# Patient Record
Sex: MALE | Race: WHITE | NOT HISPANIC OR LATINO | Employment: FULL TIME | URBAN - METROPOLITAN AREA
[De-identification: names, ages, dates, MRNs, and addresses within clinical notes are randomized per-mention and may not be internally consistent; named-entity substitution may affect disease eponyms.]

---

## 2021-03-24 ENCOUNTER — NURSE TRIAGE (OUTPATIENT)
Dept: NURSING | Facility: CLINIC | Age: 36
End: 2021-03-24

## 2021-03-25 ENCOUNTER — VIRTUAL VISIT (OUTPATIENT)
Dept: URGENT CARE | Facility: CLINIC | Age: 36
End: 2021-03-25
Payer: COMMERCIAL

## 2021-03-25 ENCOUNTER — NURSE TRIAGE (OUTPATIENT)
Dept: NURSING | Facility: CLINIC | Age: 36
End: 2021-03-25

## 2021-03-25 DIAGNOSIS — Z20.822 SUSPECTED COVID-19 VIRUS INFECTION: ICD-10-CM

## 2021-03-25 DIAGNOSIS — Z20.822 SUSPECTED COVID-19 VIRUS INFECTION: Primary | ICD-10-CM

## 2021-03-25 LAB
SARS-COV-2 RNA RESP QL NAA+PROBE: NORMAL
SPECIMEN SOURCE: NORMAL

## 2021-03-25 PROCEDURE — U0005 INFEC AGEN DETEC AMPLI PROBE: HCPCS | Performed by: NURSE PRACTITIONER

## 2021-03-25 PROCEDURE — 99203 OFFICE O/P NEW LOW 30 MIN: CPT | Mod: 95 | Performed by: NURSE PRACTITIONER

## 2021-03-25 PROCEDURE — U0003 INFECTIOUS AGENT DETECTION BY NUCLEIC ACID (DNA OR RNA); SEVERE ACUTE RESPIRATORY SYNDROME CORONAVIRUS 2 (SARS-COV-2) (CORONAVIRUS DISEASE [COVID-19]), AMPLIFIED PROBE TECHNIQUE, MAKING USE OF HIGH THROUGHPUT TECHNOLOGIES AS DESCRIBED BY CMS-2020-01-R: HCPCS | Performed by: NURSE PRACTITIONER

## 2021-03-25 NOTE — TELEPHONE ENCOUNTER
See  [phone] (for Kisha Rodriguez).  Both persons are calling together re possible covid.  Current patient already pursued virtual visit for order to seek covid testing.  Kit requests looking into his chart as well as other patient (Kisha Rodriguez) for comparison of  for covid testing.  Done now.  No further action required for this patient.    Devorah MORAN [facility] Nurse Advisor     Additional Information    General information question, no triage required and triager able to answer question    Protocols used: INFORMATION ONLY CALL-A-AH

## 2021-03-25 NOTE — PROGRESS NOTES
"  Kit Goncalves is a 35 year old male who is being evaluated via a billable telephone visit.      The patient has been notified of following:     \"This telephone visit will be conducted via a call between you and your physician/provider. We have found that certain health care needs can be provided without the need for a physical exam.  This service lets us provide the care you need with a short phone conversation.  If a prescription is necessary we can send it directly to your pharmacy.  If lab work is needed we can place an order for that and you can then stop by our lab to have the test done at a later time.    If during the course of the call the physician/provider feels a telephone visit is not appropriate, you will not be charged for this service.\"     Patient has given verbal consent for Telephone visit?  Yes       ASSESSMENT:       ICD-10-CM    1. Suspected COVID-19 virus infection  Z20.822 Symptomatic COVID-19 Virus (Coronavirus) by PCR     Encouraged increased fluid OTC medications for comfort.    Worrisome symptoms discussed with instructions to go to the ED.  Patient verbalized understanding and agreed with this plan.  Chief Complaint:    Chief Complaint   Patient presents with     Covid 19 Testing       HPI: Kit Goncalves is an 35 year old male who calls with concerns that include cough, headache, congestion, fever that is intermittent, nausea, some shortness of breath, diminished smell and taste. 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 Wife is developing similar symptoms.    Took 1st Moderna vaccine one day before symptoms started.    Ibuprofen, cough drops, Robitussin.      ROS:      A 10 point ROS is negative except as expressed in HPI.    Past Medical History  No past medical history on file.      Allergies:  Not on File     Current Meds:  No current outpatient medications on file.     PHYSICAL EXAM:     Patient is alert and oriented. Able to speak in full sentences. No wheezing or " cough heard during telephone conversation. Mood is appropriate.     Joanie Osman CNP  3/25/2021, 9:40 AM      Phone call duration:  21 minutes

## 2021-03-25 NOTE — TELEPHONE ENCOUNTER
Moderna vaccine on 3/18   Weak, vomiting, temps ranging from 97.2 - 101. Is sweating. Denies difficulty breathing - is taking ibuprofen. States he feels weak. Currently oral temp 98.8, 97.2, 97.7. Thermometer doesn't seem to be consistent. Patient is drinking fluids, peeing normally.    Per protocol advised virtual  tomorrow - encouraged increasing fluids. Warm transferred to scheduling.    Gilda Miller RN on 3/24/2021 at 9:07 PM    Reason for Disposition    COVID-19 vaccine, systemic reactions (e.g., fatigue, fever, muscle aches), questions about    Additional Information    Negative: [1] Difficulty breathing or swallowing AND [2] starts within 2 hours after injection    Negative: Sounds like a life-threatening emergency to the triager    Negative: [1] COVID-19 exposure AND [2] no symptoms    Negative: [1] Typical COVID-19 symptoms AND [2] symptoms that are NOT expected from vaccine (e.g., cough, difficulty breathing, loss of taste or smell, runny nose, sore throat)    Negative: [1] Typical COVID-19 symptoms AND [2] started > 3 days after getting vaccine    Negative: Fever > 104 F (40 C)    Negative: Sounds like a severe, unusual reaction to the triager    Negative: [1] Redness or red streak around the injection site AND [2] started > 48 hours after getting vaccine AND [3] fever    Negative: [1] Fever > 101 F (38.3 C) AND [2] age > 60 AND [3] started > 48 hours after getting vaccine    Negative: [1] Fever > 100.0 F (37.8 C) AND [2] bedridden (e.g., nursing home patient, CVA, chronic illness, recovering from surgery) AND [3] started > 48 hours after getting vaccine    Negative: [1] Fever > 100.0 F (37.8 C) AND [2] diabetes mellitus or weak immune system (e.g., HIV positive, cancer chemo, splenectomy, organ transplant, chronic steroids) AND [3] started > 48 hours after getting vaccine    Negative: [1] Redness or red streak around the injection site AND [2] started > 48 hours after getting vaccine AND [3] no fever   (Exception: red area < 1 inch or 2.5 cm wide)    Negative: [1] Pain, tenderness, or swelling at the injection site AND [2] over 3 days (72 hours) since vaccine AND [3] getting worse    Negative: Fever > 100.0 F (37.8 C) present > 3 days (72 hours)    Negative: [1] Fever > 100.0 F (37.8 C) AND [2] healthcare worker    Negative: [1] Pain, tenderness, or swelling at the injection site AND [2] lasts > 7 days    Negative: [1] Requesting COVID-19 vaccine AND [2] healthcare worker (e.g., EMS first responders, doctors, nurses)    Negative: [1] Requesting COVID-19 vaccine AND [2] resident of a long-term care facility (e.g., nursing home)    Negative: [1] Requesting COVID-19 vaccine AND [2] vaccine available in the community for this patient group    Negative: COVID-19 vaccine, injection site reaction (e.g., pain, redness, swelling), question about    Protocols used: CORONAVIRUS (COVID-19) VACCINE QUESTIONS AND [id number] 1.3

## 2021-03-25 NOTE — PATIENT INSTRUCTIONS
"  Patient Education   After Your COVID-19 (Coronavirus) Test  You have been tested for COVID-19 (coronavirus).   If you'll have surgery in the next few days, we'll let you know ahead of time if you have the virus. Please call your surgeon's office with any questions.  For all other patients: Results are usually available in [employer] within 2 to 3 days.   If you do not have a [employer] account, you'll get a letter in the mail in about 7 to 10 days.   [employer] is often the fastest way to get test results. Please sign up if you do not already have a [employer] account. See the handout Getting COVID-19 Test Results in [employer] for help.  What if my test result is positive?  If your test is positive and you have not viewed your result in [employer], you'll get a phone call with your result. (A positive test means that you have the virus.)     Follow the tips under \"How do I self-isolate?\" below for 10 days (20 days if you have a weak immune system).    You don't need to be retested for COVID-19 before going back to school or work. As long as you're fever-free and feeling better, you can go back to school, work and other activities after waiting the 10 or 20 days.  What if I have questions after I get my results?  If you have questions about your results, please visit our testing website at [email or web address].   After 7 to 10 days, if you have not gotten your results:     Call [phone] ([phone]) and ask to speak with our COVID-19 results team.    If you're being treated at an infusion center: Call your infusion center directly.  What are the symptoms of COVID-19?  Cough, fever and trouble breathing are the most common signs of COVID-19.  Other symptoms can include new headaches, new muscle or body aches, new and unexplained fatigue (feeling very tired), chills, sore throat, congestion (stuffy or runny nose), diarrhea (loose poop), loss of taste or smell, belly pain, and nausea or vomiting " "(feeling sick to your stomach or throwing up).  You may already have symptoms of COVID-19, or they may show up later.  What should I do if I have symptoms?  If you're having surgery: Call your surgeon's office.  For all other patients: Stay home and away from others (self-isolate) until ...    You've had no fever--and no medicine that reduces fever--for 1 full day (24 hours), AND    Other symptoms have gotten better. For example, your cough or breathing has improved, AND    At least 10 days have passed since your symptoms first started.  How do I self-isolate?    Stay in your own room, even for meals. Use your own bathroom if you can.    Stay away from others in your home. No hugging, kissing or shaking hands. No visitors.    Don't go to work, school or anywhere else.    Clean \"high touch\" surfaces often (doorknobs, counters, handles). Use household cleaning spray or wipes. You'll find a full list of  on the EPA website: www.epa.gov/pesticide-registration/list-n-disinfectants-use-against-sars-cov-2.    Cover your mouth and nose with a mask or other face covering to avoid spreading germs.    Wash your hands and face often. Use soap and water.    Caregivers in these groups are at risk for severe illness due to COVID-19:  ? People 65 years and older  ? People who live in a nursing home or long-term care facility  ? People with chronic disease (lung, heart, cancer, diabetes, kidney, liver, immunologic)  ? People who have a weakened immune system, including those who:    Are in cancer treatment    Take medicine that weakens the immune system, such as corticosteroids    Had a bone marrow or organ transplant    Have an immune deficiency    Have poorly controlled HIV or AIDS    Are obese (body mass index of 40 or higher)    Smoke regularly    Caregivers should wear gloves while washing dishes, handling laundry and cleaning bedrooms and bathrooms.    Use caution when washing and drying laundry: Don't shake dirty " laundry and use the warmest water setting that you can.    For more tips on managing your health at home, go to www.cdc.gov/coronavirus/2019-ncov/downloads/10Things.pdf.  How can I take care of myself at home?  1. Get lots of rest. Drink extra fluids (unless a doctor has told you not to).  2. Take Tylenol (acetaminophen) for fever or pain. If you have liver or kidney problems, ask your family doctor if it's OK to take Tylenol.   Adults can take either:  ? 650 mg (two 325 mg pills) every 4 to 6 hours, or   ? 1,000 mg (two 500 mg pills) every 8 hours as needed.  ? Note: Don't take more than 3,000 mg in one day. Acetaminophen is found in many medicines (both prescribed and over-the-counter medicines). Read all labels to be sure you don't take too much.   For children, check the Tylenol bottle for the right dose. The dose is based on the child's age or weight.  3. If you have other health problems (like cancer, heart failure, an organ transplant or severe kidney disease): Call your specialty clinic if you don't feel better in the next 2 days.  4. Know when to call 911. Emergency warning signs include:  ? Trouble breathing or shortness of breath  ? Chest pain or pressure that doesn't go away  ? Feeling confused like you haven't felt before, or not being able to wake up  ? Bluish-colored lips or face  5. If your doctor prescribed a blood thinner medicine: Follow their instructions.  Where can I get more information?    [facility] - About COVID-19:   [email or web address]    CDC - If You're Sick: cdc.gov/coronavirus/2019-ncov/about/steps-when-sick.html    CDC - Ending Home Isolation: www.cdc.gov/coronavirus/2019-ncov/hcp/disposition-in-home-patients.html    CDC - Caring for Someone: www.cdc.gov/coronavirus/2019-ncov/if-you-are-sick/care-for-someone.html    [facility] - Interim Guidance for Hospital Discharge to Home: www.health.[facility].[place]/diseases/coronavirus/hcp/hospdischarge.pdf    [facility]  clinical trials (COVID-19 research studies): [email or web address].[email or web address]-clinical-trials    Below are the COVID-19 hotlines at the [place] Department of Health ([facility]). Interpreters are available.  ? For health questions: Call 985-647-1213 or [phone] (7 a.m. to 7 p.m.)  ? For questions about schools and childcare: Call 738-684-1437 or [phone] (7 a.m. to 7 p.m.)    For informational purposes only. Not to replace the advice of your health care provider. Clinically reviewed by Infection Prevention and the [facility] COVID-19 Clinical Team. Copyright   2020 [facility]. All rights reserved. [employer] 995763 - Rev 11/11/20.

## 2021-03-26 ENCOUNTER — NURSE TRIAGE (OUTPATIENT)
Dept: NURSING | Facility: CLINIC | Age: 36
End: 2021-03-26

## 2021-03-26 ENCOUNTER — TELEPHONE (OUTPATIENT)
Dept: URGENT CARE | Facility: CLINIC | Age: 36
End: 2021-03-26

## 2021-03-26 LAB
LABORATORY COMMENT REPORT: ABNORMAL
SARS-COV-2 RNA RESP QL NAA+PROBE: POSITIVE
SPECIMEN SOURCE: ABNORMAL

## 2021-03-26 NOTE — TELEPHONE ENCOUNTER
Coronavirus (COVID-19) Notification    Reason for call  Notify of POSITIVE  COVID-19 lab result, assess symptoms,  review [facility] recommendations    Lab Result   Lab test for 2019-nCoV rRt-PCR or SARS-COV-2 PCR  Oropharyngeal AND/OR nasopharyngeal swabs were POSITIVE for 2019-nCoV RNA [OR] SARS-COV-2 RNA (COVID-19) RNA     We have been unable to reach Patient by phone at this time to notify of their Positive COVID-19 result.  Left voicemail message requesting a call back to [phone] [facility] for results.        POSITIVE COVID-19 Letter sent.    Blank Donahue LPN

## 2021-03-26 NOTE — TELEPHONE ENCOUNTER
"-Coronavirus (COVID-19) Notification    Caller Name (Patient, parent, daughter/son, grandparent, etc)  Kit, patient    Reason for call  Notify of Positive Coronavirus (COVID-19) lab results, assess symptoms,  review  [facility] recommendations    Lab Result    Lab test:  2019-nCoV rRt-PCR or SARS-CoV-2 PCR    Oropharyngeal AND/OR nasopharyngeal swabs is POSITIVE for 2019-nCoV RNA/SARS-COV-2 PCR (COVID-19 virus)    RN Recommendations/Instructions per [facility] Coronavirus COVID-19 recommendations    Brief introduction script  Introduce self then review script:  \"I am calling on behalf of [employer].  We were notified that your Coronavirus test (COVID-19) for was POSITIVE for the virus.  I have some information to relay to you but first I wanted to mention that the [place] Dept of Health will be contacting you shortly [it's possible MD already called Patient] to talk to you more about how you are feeling and other people you have had contact with who might now also have the virus.  Also,  [employer] is Partnering with the [facility] for Covid-19 research, you may be contacted directly by research staff.\"    Assessment (Inquire about Patient's current symptoms)   Assessment   Current Symptoms at time of phone call: (if no symptoms, document No symptoms] Severe cough, patient cannot catch his breath during coughing spell, lips are not blue at this time, dizziness, nausea, diarrhea, severe fatigue  Severe brain fog, fever, chills, body aches, light chest pain and pressure  Nasal congestion.  Transferred patient to COVID Triage Nurse.   Symptoms onset (if applicable) 3/19/2021     If at time of call, Patients symptoms hare worsened, the Patient should contact 911 or have someone drive them to Emergency Dept promptly:      If Patient calling 911, inform 911 personal that you have tested positive for the Coronavirus (COVID-19).  Place mask on and await 911 to arrive.    If Emergency Dept, " "If possible, please have another adult drive you to the Emergency Dept but you need to wear mask when in contact with other people.      Monoclonal Antibody Administration    You may be eligible to receive a new treatment with a monoclonal antibody for preventing hospitalization in patients at high risk for complications from COVID-19.   This medication is still experimental and available on a limited basis; it is given through an IV and must be given at an infusion center. Please note that not all people who are eligible will receive the medication since it is in limited supply.     Are you interested in being considered for this medication?  No.   Does the patient fit the criteria: Patient declined    If patient qualifies based on above criteria:  \"We will contact you if you are selected to receive the medication in the next 1-2 days.   This is time sensitive and if you are not selected in the next 1-2 days, you will not receive the medication.  If you do not receive a call to schedule, you have not been selected.\"      Review information with Patient    Your result was positive. This means you have COVID-19 (coronavirus).  We have sent you a letter that reviews the information that I'll be reviewing with you now.    How can I protect others?    If you have symptoms: stay home and away from others (self-isolate) until:    You've had no fever--and no medicine that reduces fever--for 1 full day (24 hours). And       Your other symptoms have gotten better. For example, your cough or breathing has improved. And     At least 10 days have passed since your symptoms started. (If you've been told by a doctor that you have a weak immune system, wait 20 days.)     If you don't have symptoms: Stay home and away from others (self-isolate) until at least 10 days have passed since your first positive COVID-19 test. (Date test collected)    During this time:    Stay in your own room, including for meals. Use your own bathroom if " you can.    Stay away from others in your home. No hugging, kissing or shaking hands. No visitors.     Don't go to work, school or anywhere else.     Clean  high touch  surfaces often (doorknobs, counters, handles, etc.). Use a household cleaning spray or wipes. You'll find a full list on the EPA website at www.epa.gov/pesticide-registration/list-n-disinfectants-use-against-sars-cov-2.     Cover your mouth and nose with a mask, tissue or other face covering to avoid spreading germs.    Wash your hands and face often with soap and water.    Make a list of people you have been in close contact with recently, even if either of you wore a face covering.   ; Start your list from 2 days before you became ill or had a positive test.  ; Include anyone that was within 6 feet of you for a cumulative total of 15 minutes or more in 24 hours. (Example: if you sat next to Canelo for 5 minutes in the morning and 10 minutes in the afternoon, then you were in close contact for 15 minutes total that day. Canelo would be added to your list.)    A public health worker will call or text you. It is important that you answer. They will ask you questions about possible exposures to COVID-19, such as people you have been in direct contact with and places you have visited.    Tell the people on your list that you have COVID-19; they should stay away from others for 14 days starting from the last time they were in contact with you (unless you are told something different from a public health worker).     Caregivers in these groups are at risk for severe illness due to COVID-19:  o People 65 years and older  o People who live in a nursing home or long-term care facility  o People with chronic disease (lung, heart, cancer, diabetes, kidney, liver, immunologic)  o People who have a weakened immune system, including those who:  - Are in cancer treatment  - Take medicine that weakens the immune system, such as corticosteroids  - Had a bone marrow or  organ transplant  - Have an immune deficiency  - Have poorly controlled HIV or AIDS  - Are obese (body mass index of 40 or higher)  - Smoke regularly    Caregivers should wear gloves while washing dishes, handling laundry and cleaning bedrooms and bathrooms.    Wash and dry laundry with special caution. Don't shake dirty laundry, and use the warmest water setting you can.    If you have a weakened immune system, ask your doctor about other actions you should take.    For more tips, go to www.cdc.gov/coronavirus/2019-ncov/downloads/10Things.pdf.    You should not go back to work until you meet the guidelines above for ending your home isolation. You don't need to be retested for COVID-19 before going back to work--studies show that you won't spread the virus if it's been at least 10 days since your symptoms started (or 20 days, if you have a weak immune system).    Employers: This document serves as formal notice of your employee's medical guidelines for going back to work. They must meet the above guidelines before going back to work in person.    How can I take care of myself?    1. Get lots of rest. Drink extra fluids (unless a doctor has told you not to).    2. Take Tylenol (acetaminophen) for fever or pain. If you have liver or kidney problems, ask your family doctor if it's okay to take Tylenol.     Take either:     650 mg (two 325 mg pills) every 4 to 6 hours, or     1,000 mg (two 500 mg pills) every 8 hours as needed.     Note: Don't take more than 3,000 mg in one day. Acetaminophen is found in many medicines (both prescribed and over-the-counter medicines). Read all labels to be sure you don't take too much.    For children, check the Tylenol bottle for the right dose (based on their age or weight).    3. If you have other health problems (like cancer, heart failure, an organ transplant or severe kidney disease): Call your specialty clinic if you don't feel better in the next 2 days.    4. Know when to call  911: Emergency warning signs include:    Trouble breathing or shortness of breath    Pain or pressure in the chest that doesn't go away    Feeling confused like you haven't felt before, or not being able to wake up    Bluish-colored lips or face    5. Sign up for [employer]. We know it's scary to hear that you have COVID-19. We want to track your symptoms to make sure you're okay over the next 2 weeks. Please look for an email from [employer]--this is a free, online program that we'll use to keep in touch. To sign up, follow the link in the email. Learn more at [email or web address].    Where can I get more information?    [facility]: [email or web address]    Coronavirus Basics: www.health.[facility].[place]/diseases/coronavirus/basics.html    What to Do If You're Sick: www.cdc.gov/coronavirus/2019-ncov/about/steps-when-sick.html    Ending Home Isolation: www.cdc.gov/coronavirus/2019-ncov/hcp/disposition-in-home-patients.html     Caring for Someone with COVID-19: www.cdc.gov/coronavirus/2019-ncov/if-you-are-sick/care-for-someone.html     [facility] clinical trials (COVID-19 research studies): clinicalaffairs.[email or web address]/n-clinical-trials     A Positive COVID-19 letter will be sent via [employer] or the mail. (Exception, no letters sent to Presurgerical/Preprocedure Patients)    Christiane Brower LPN

## 2021-03-26 NOTE — TELEPHONE ENCOUNTER
"\"I was dx with covid yesterday and I am coughing(dry), diarrhea, headache, tiredness, chills.\"    Denies fever, SOB or other sx   Triaged, gave home care advice and when to seek emergency care if needed.  Call back if needed.  Lety Oates RN [facility]    COVID 19 Nurse Triage Plan/Patient Instructions    Please be aware that novel coronavirus (COVID-19) may be circulating in the community. If you develop symptoms such as fever, cough, or SOB or if you have concerns about the presence of another infection including coronavirus (COVID-19), please contact your health care provider or visit [email or web address].     Disposition/Instructions    Home care recommended. Follow home care protocol based instructions.    Thank you for taking steps to prevent the spread of this virus.  o Limit your contact with others.  o Wear a simple mask to cover your cough.  o Wash your hands well and often.    Resources    [facility]: About COVID-19: [email or web address]    CDC: What to Do If You're Sick: www.cdc.gov/coronavirus/2019-ncov/about/steps-when-sick.html    CDC: Ending Home Isolation: www.cdc.gov/coronavirus/2019-ncov/hcp/disposition-in-home-patients.html     CDC: Caring for Someone: www.cdc.gov/coronavirus/2019-ncov/if-you-are-sick/care-for-someone.html     [facility]: Interim Guidance for Hospital Discharge to Home: www.health.[facility].[place]/diseases/coronavirus/hcp/hospdischarge.pdf    [facility] clinical trials (COVID-19 research studies): [email or web address].[facility]/n-clinical-trials     Below are the COVID-19 hotlines at the [facility] of Health ([facility]). Interpreters are available.   o For health questions: Call 811-890-4424 or 1-772.110.6690 (7 a.m. to 7 p.m.)  o For questions about schools and childcare: Call 206-611-6796 or 1-187.125.7030 (7 a.m. to 7 p.m.)                     Reason for Disposition    [1] COVID-19 diagnosed by positive lab test AND [2] mild " symptoms (e.g., cough, fever, others) AND [3] no complications or SOB    Additional Information    Negative: SEVERE difficulty breathing (e.g., struggling for each breath, speaks in single words)    Negative: Difficult to awaken or acting confused (e.g., disoriented, slurred speech)    Negative: Bluish (or gray) lips or face now    Negative: Shock suspected (e.g., cold/pale/clammy skin, too weak to stand, low BP, rapid pulse)    Negative: Sounds like a life-threatening emergency to the triager    Negative: SEVERE or constant chest pain or pressure (Exception: mild central chest pain, present only when coughing)    Negative: MODERATE difficulty breathing (e.g., speaks in phrases, SOB even at rest, pulse 100-120)    Negative: [1] Headache AND [2] stiff neck (can't touch chin to chest)    Negative: MILD difficulty breathing (e.g., minimal/no SOB at rest, SOB with walking, pulse <100)    Negative: Chest pain or pressure    Negative: Patient sounds very sick or weak to the triager    Negative: Fever > 103 F (39.4 C)    Negative: [1] Fever > 101 F (38.3 C) AND [2] age > 60    Negative: [1] Fever > 100.0 F (37.8 C) AND [2] bedridden (e.g., nursing home patient, CVA, chronic illness, recovering from surgery)    Negative: [1] HIGH RISK patient (e.g., age > 64 years, diabetes, heart or lung disease, weak immune system) AND [2] new or worsening symptoms    Negative: [1] HIGH RISK patient AND [2] influenza is widespread in the community AND [3] ONE OR MORE respiratory symptoms: cough, sore throat, runny or stuffy nose    Negative: [1] HIGH RISK patient AND [2] influenza exposure within the last 7 days AND [3] ONE OR MORE respiratory symptoms: cough, sore throat, runny or stuffy nose    Negative: Fever present > 3 days (72 hours)    Negative: [1] Fever returns after gone for over 24 hours AND [2] symptoms worse or not improved    Negative: [1] Continuous (nonstop) coughing interferes with work or school AND [2] no improvement  using cough treatment per protocol    Negative: [1] COVID-19 infection suspected by caller or triager AND [2] mild symptoms (cough, fever, or others) AND [3] no complications or SOB    Negative: Cough present > 3 weeks    Protocols used: CORONAVIRUS (COVID-19) DIAGNOSED OR [id number] 1.3

## 2021-05-09 ENCOUNTER — HEALTH MAINTENANCE LETTER (OUTPATIENT)
Age: 36
End: 2021-05-09

## 2021-10-24 ENCOUNTER — HEALTH MAINTENANCE LETTER (OUTPATIENT)
Age: 36
End: 2021-10-24

## 2022-01-02 ENCOUNTER — NURSE TRIAGE (OUTPATIENT)
Dept: NURSING | Facility: CLINIC | Age: 37
End: 2022-01-02

## 2022-01-03 NOTE — TELEPHONE ENCOUNTER
Has been sick since the day before Mati Rodas    Coughing fits, cant breathe   Difficulty breathing  Hacking up nasty mucus  White nasal discharge   Heavy sinus congestion   Pink eye   White pus   Blurry vision     Getting worse all the time    NO PCP    COVID 19 Nurse Triage Plan/Patient Instructions    Please be aware that novel coronavirus (COVID-19) may be circulating in the community. If you develop symptoms such as fever, cough, or SOB or if you have concerns about the presence of another infection including coronavirus (COVID-19), please contact your health care provider or visit [email or web address].     Disposition/Instructions    ED Visit recommended. Follow protocol based instructions.     Bring Your Own Device:  Please also bring your smart device(s) (smart phones, tablets, laptops) and their charging cables for your personal use and to communicate with your care team during your visit.    Thank you for taking steps to prevent the spread of this virus.  o Limit your contact with others.  o Wear a simple mask to cover your cough.  o Wash your hands well and often.    Resources    [facility]: About COVID-19: [email or web address]    CDC: What to Do If You're Sick: www.cdc.gov/coronavirus/2019-ncov/about/steps-when-sick.html    CDC: Ending Home Isolation: www.cdc.gov/coronavirus/2019-ncov/hcp/disposition-in-home-patients.html     CDC: Caring for Someone: www.cdc.gov/coronavirus/2019-ncov/if-you-are-sick/care-for-someone.html     [facility]: Interim Guidance for Hospital Discharge to Home: www.health.[facility].mn.us/diseases/coronavirus/hcp/hospdischarge.pdf    [facility] clinical [facility] (COVID-19 research studies): clinicalaffairs.[facility].edu/umn-clinical-trials     Below are the COVID-19 hotlines at the [place] [facility] ([facility]). Interpreters are available.   o For health questions: Call 288-375-2461 or 1-724.150.6471 (7 a.m. to 7 p.m.)  o For questions about schools  and childcare: Call 492-821-9811 or 1-850.645.4432 (7 a.m. to 7 p.m.)     Reason for Disposition    MODERATE difficulty breathing (e.g., speaks in phrases, SOB even at rest, pulse 100-120)    Additional Information    Negative: SEVERE difficulty breathing (e.g., struggling for each breath, speaks in single words)    Negative: Difficult to awaken or acting confused (e.g., disoriented, slurred speech)    Negative: Bluish (or gray) lips or face now    Negative: Shock suspected (e.g., cold/pale/clammy skin, too weak to stand, low BP, rapid pulse)    Negative: Sounds like a life-threatening emergency to the triager    Negative: [1] COVID-19 exposure AND [2] no symptoms    Negative: COVID-19 vaccine reaction suspected (e.g., fever, headache, muscle aches) occurring 1 to 3 days after getting vaccine    Negative: COVID-19 vaccine, questions about    Negative: [1] Lives with someone known to have influenza (flu test positive) AND [2] flu-like symptoms (e.g., cough, runny nose, sore throat, SOB; with or without fever)    Negative: [1] Adult with possible COVID-19 symptoms AND [2] triager concerned about severity of symptoms or other causes    Negative: COVID-19 and breastfeeding, questions about    Negative: SEVERE or constant chest pain or pressure (Exception: mild central chest pain, present only when coughing)    Protocols used: CORONAVIRUS (COVID-19) DIAGNOSED OR [id number] 8.25.2021    Shawna Ferguson RN Triage Nurse Advisor

## 2022-06-05 ENCOUNTER — HEALTH MAINTENANCE LETTER (OUTPATIENT)
Age: 37
End: 2022-06-05

## 2022-09-12 ENCOUNTER — HOSPITAL ENCOUNTER (EMERGENCY)
Facility: CLINIC | Age: 37
Discharge: HOME | End: 2022-09-13
Attending: EMERGENCY MEDICINE | Admitting: EMERGENCY MEDICINE

## 2022-09-12 VITALS
RESPIRATION RATE: 16 BRPM | SYSTOLIC BLOOD PRESSURE: 175 MMHG | OXYGEN SATURATION: 99 % | HEART RATE: 85 BPM | DIASTOLIC BLOOD PRESSURE: 94 MMHG | TEMPERATURE: 98.2 F

## 2022-09-12 DIAGNOSIS — F41.9 ANXIETY: ICD-10-CM

## 2022-09-12 DIAGNOSIS — R45.851 PASSIVE SUICIDAL IDEATIONS: ICD-10-CM

## 2022-09-12 PROCEDURE — 99285 EMERGENCY DEPT VISIT HI MDM: CPT | Mod: 25

## 2022-09-12 PROCEDURE — 90791 PSYCH DIAGNOSTIC EVALUATION: CPT

## 2022-09-12 ASSESSMENT — ENCOUNTER SYMPTOMS
DIARRHEA: 0
COUGH: 1
DYSPHORIC MOOD: 1
NERVOUS/ANXIOUS: 1
CHILLS: 0
FEVER: 0
DECREASED CONCENTRATION: 1
VOMITING: 0

## 2022-09-12 ASSESSMENT — ACTIVITIES OF DAILY LIVING (ADL)
ADLS_ACUITY_SCORE: 35
ADLS_ACUITY_SCORE: 35

## 2022-09-13 NOTE — ED TRIAGE NOTES
"    Patient reports symptoms for years, but hasnt had insurance to seek treatment. Symptoms have gotten progressively worse which is what prompted him to seek treatment today. He reports being paranoid, he believes that everyone else is talking about him and is out to get him. He has also been spending lots of money and has feelings of extreme euphoria that he knows are not appropriate to the situation. Recently he has developed suicidal thoughts. He wife states that he has planned on inhaling car exhaust or drowning self in bathtub, but has never acted on these thoughts. In triage he contracts for safety and \"just wants to get help.\"  "

## 2022-09-13 NOTE — DISCHARGE INSTRUCTIONS
Aftercare Plan  If I am feeling unsafe or I am in a crisis, I will:   Contact my established care providers   Call the National Suicide Prevention Lifeline: 988  Go to the nearest emergency room   Call 911     Warning signs that I or other people might notice when a crisis is developing for me:   Increased anxiety  Anger outbursts   Inability to regulate emotions  Feelings of hopelessness    Things I am able to do on my own to cope or help me feel better:   Deep breathing  Play computer games  Rest  Long hot shower    Things that I am able to do with others to cope or help me better:   Spend time with Dayana, her family, and two cats at home    Things I can use or do for distraction:   Planning for the future  [employer] courses  [employer] selling    Changes I can make to support my mental health and wellness:   Begin individual therapy  Continue to spend time with Dayana and her mom  Return to work from home  Obtain health insurance       People in my life that I can ask for help: Dayana and her mom    Your [facility] has a mental health crisis team you can call 24/7: [facility] Crisis  [phone]    Other things that are important when I'm in crisis: Remember that there is light at the end of the tunnel, you have big career aspirations and a future family life with Dayana.      Additional resources and information:     It is recommended that you obtain insurance ASAP. [employer] application can be completed at: [email or web address]      Crisis Lines  Crisis Text Line  Text 873820  You will be connected with a trained live crisis counselor to provide support.    Por andres, texto  CODY a 058150 o texto a 442-AYUDAME en WhatsApp    The [employer] (LGBTQ Youth Crisis Line)  0.936.484.6723  text START to 445-708      Community Resources  Fast Tracker  Linking people to mental health and substance use disorder resources  [employer].org     [place] Mental Health Warm Line  Peer to peer support  Monday thru  "Saturday, 12 pm to 10 pm  725.733.2431 or 4.313.102.8433  Text \"Support\" to 50037    National [place] on Mental Illness ([place])  539.580.3579 or 1.888.[place]      Mental Health Apps  My3  https://RentBitspp.org/    VirtualHopeBox  https://[employer] Link/apps/virtual-hope-box/      [facility] SCHEDULING:  Today you were seen by a licensed mental health professional through [facility] sevices, [facility] ([facility])  for a crisis assessment in the Emergency Department at [facility].  It is recommended that you follow up with your estabished providers (psychiatrist, mental health therapist, and/or primary care doctor - as relevant) as soon as possible. Coordinators from [facility] will be calling you in the next 24-48 hours to ensure that you have the resources you need.  You can also contact [facility] coordinators directly at [phone].    You have been scheduled the following appointments:     Date: Thursday, 9/15/2022  Time: 12:00 pm - 1:00 pm  Provider: Yessenia Bazzi MA  Location: [address] [place], [place] [address]  Phone: [phone]  Type: Teletherapy    Patient Instructions  To reduce no shows, we ask that patients call our office at [phone] and confirm their appointment and leave their email. We make effort to reach each client, but if we cannot reach them or they do not confirm appointment, the appointment will be removed. Please ask patients to leave a voicemail with their information.    [facility] maintains an extensive network of licensed behavioral health providers to connect patients with the services they need.  We do not charge providers a fee to participate in our referral network.  We match patients with providers based on a patient s specific needs, insurance coverage, and location.  Our first effort will be to refer you to a provider within your care system, and will utilize providers outside your care system as needed.      Discharge " Instructions  Mental Health Concerns    You were seen today for mental health concerns, such as depression, anxiety, or suicidal thinking. Your provider feels that you do not require hospitalization at this time. However, your symptoms may become worse, and you may need to return to the Emergency Department. Most treatments of depression and suicidal thoughts are a process rather than a single intervention.  Medications and counseling can take several weeks or more to help.    Generally, every Emergency Department visit should have a follow-up clinic visit with either a primary or a specialty clinic/provider. Please follow-up as instructed by your emergency provider today.    By accepting these discharge instructions:  You promise to not harm yourself or others.  You agree that if you feel you are becoming unable to keep that promise, you will do something to help yourself before you do anything to harm yourself or others.   You agree to keep any safety plan arranged on your visit here today.  You agree to take any medication prescribed or recommended by your provider.  If you are getting worse, you can contact a friend or a family member, contact your counselor or family provider, contact a crisis line, or other options discussed with the provider or therapist today.  At any time, you can call 911 and return to the Emergency Department for more help.  You understand that follow-up is essential to your treatment, and you will make and keep appointments recommended on your visit today.    How to improve your mental health and prevent suicide:  Involve others by letting family, friends, counselors know.  Do not isolate yourself.  Avoid alcohol or drugs. Remove weapons, poisons from your home.  Try to stick to routines for eating, sleeping and getting regular exercise.    Try to get into sunlight. Bright natural light not only treats seasonal affective disorder but also depression.  Increase safe activities that you  enjoy.    If you feel worse, contact the National Suicide Prevention Lifeline at 1-131.614.7908, or call 911, or your primary provider/counselor for additional assistance.      If you were given a prescription for medicine here today, be sure to read all of the information (including the package insert) that comes with your prescription.  This will include important information about the medicine, its side effects, and any warnings that you need to know about.  The pharmacist who fills the prescription can provide more information and answer questions you may have about the medicine.  If you have questions or concerns that the pharmacist cannot address, please call or return to the Emergency Department.     Remember that you can always come back to the Emergency Department if you are not able to see your regular provider in the amount of time listed above, if you get any new symptoms, or if there is anything that worries you.

## 2022-09-13 NOTE — CONSULTS
"Diagnostic Evaluation Consultation  Crisis Assessment    Patient was assessed: In Person  Patient location: [facility] ED  Was a release of information signed: Yes. Providers included on the release: Pt has no established providers, signed for referrals for OP care      Referral Data and Chief Complaint  Jeffrey Goncalves is a 36 year old, who uses he/him pronouns, and presents to the ED with family/friends. Patient is referred to the ED by self. Patient is presenting to the ED for the following concerns: Increased anxiety and passive SI following notice from his employer that he would be required to return to work in person.      Informed Consent and Assessment Methods     Patient is his own guardian. Writer met with patient and explained the crisis assessment process, including applicable information disclosures and limits to confidentiality, assessed understanding of the process, and obtained consent to proceed with the assessment. Patient was observed to be able to participate in the assessment as evidenced by orientation x4. Assessment methods included conducting a formal interview with patient, review of medical records, collaboration with medical staff, and obtaining relevant collateral information from family and community providers when available..     Over the course of this crisis assessment provided reassurance, offered validation, engaged patient in problem solving and disposition planning, worked with patient on safety and aftercare planning and provided psychoeducation. Patient's response to interventions was positive - he reported \"this is what I needed, someone to to talk to. I am feeling much better already.\"     Summary of Patient Situation  Patient reported over the last two weeks, when he was told by his employer that he would need to return to in person work, he has been experiencing increased anxiety and passive SI. Pt reported his father  unexpectedly 2 years ago in September and noted this is a " "difficult month for him due to unresolved grief. Reported his symptoms became increasingly worse following his dads death, although reported them to be manageable. Pt reported in the last two weeks, his anxiety has been daily. Noted symptoms of passive SI \"every few days\" in the last two weeks. Reported symptoms of hypervigilance, difficulty with sleep, racing thoughts, difficulty with concentration, excessive worry - particularly that others are judging him.     Brief Psychosocial History  Pt currently lives at home with his wife at two cats. Reported they have goals of being parents in the near future as well. Jeffrey works in [employer], however his job is contractual and because he is not an employee, he does not receive insurance benefits. Pt noted this as a stressor and barrier to care. Pt reported growing up with 3 younger siblings and living in his moms house until the age of 14. Reported being very close with his dad and that mom was chaotic, unpredictable, and \"not a mom.\" Reported dad  two years ago of a drug overdose and noted significant drug issues on his dads side of the family. Pt reported his mom abruptly sent him to live with his dad at 14 and he shortly after dropped out of school. Pt reported he does not speak with his mother or siblings anymore as they are not mentally healthy. Reported close relationships with his wife and her side of the family and identified his mother in law as a major support in his life.  Pt received his GED in his 30s and is working towards a higher level of certification in [employer]. Pt reported long-term goals of being a . Pt identified he enjoys his work but prefers to work from home due to his anxiety. Pt reported he is spiritual, believes in god, and an after life but does not affiliate with any Roman Catholic.     Significant Clinical History   Reported anxiety symptoms since the age of 5, after a hospital stay due to a severe case of meningitis. Reported being " "bullied in school. Reported an unpredictable home environment and noted many instances of being let down by others. Reported having a psychological evaluation when he was a teen which resulted in medication management of Prozac and Depakote. Pt reported he could not remember what he was diagnosed with at the time stating \"depression, or ADHD, or something like that.\" Pt reported no other mental health instances since that negative experience.       Collateral Information  The following information was received from Dayana whose relationship to the patient is spouse. Information was obtained in person. Dayana was in the room with pt for the entirety of the assessment.     What happened today: Jeffrey came home from work and was crying/extremely anxious. Reported that his employer would let him stay home from work with a DrJulia's note. Because they don't have insurance, they came to the ED.     What is different about patient's functioning: Jeffrey has never mentioned dying before getting the notice of return to work 2 days ago. She has observed anger outbursts, crying, difficulty sleeping, increased anxiety, and reported hopelessness. Reported he mentions leaving his car on in the garage 3 times per week.     Concern about alcohol/drug use: No    What do you think the patient needs: A therapist - someone to talk to and to work from home.     Has patient made comments about wanting to kill themselves/others:  Yes In the last two weeks, pt has reported to his wife 2-3 times weekly that he feels hopeless about work and considers leaving his car on in the garage. She does not think he will do this and is just venting.     If d/c is recommended, can they take part in safety/aftercare planning: Yes - Dayana was involved in aftercare planning and supports him going to therapy. Reported there are no weapons in the home and that she checks on him often. Identified she knows when he comes home from work and wouldn't let him stay in the " "garage.    Other information: Jeffrey appears to trust Dayana a great deal and identified her as a significant protective factor.         Risk Assessment  ESS-6  1.a. Over the past 2 weeks, have you had thoughts of killing yourself? Yes  1.b. Have you ever attempted to kill yourself and, if yes, when did this last happen? No   2. Recent or current suicide plan? Yes leaving his car on in the garage - \"I heard it is a pain free way to go\"   3. Recent or current intent to act on ideation? No  4. Lifetime psychiatric hospitalization? No  5. Pattern of excessive substance use? No  6. Current irritability, agitation, or aggression? No  Scoring note: BOTH 1a and 1b must be yes for it to score 1 point, if both are not yes it is zero. All others are 1 point per number. If all questions 1a/1b - 6 are no, risk is negligible. If one of 1a/1b is yes, then risk is mild. If either question 2 or 3, but not both, is yes, then risk is automatically moderate regardless of total score. If both 2 and 3 are yes, risk is automatically high regardless of total score.      Score: 1, mild risk      Does the patient have access to lethal means? No     Does the patient engage in non-suicidal self-injurious behavior (NSSI/SIB)? no     Does the patient have thoughts of harming others? No     Is the patient engaging in sexually inappropriate behavior?  no        Current Substance Abuse     Is there recent substance abuse? no     Was a urine drug screen or blood alcohol level obtained: No       Mental Status Exam     Affect: Appropriate   Appearance: Appropriate    Attention Span/Concentration: Attentive  Eye Contact: Engaged   Fund of Knowledge: Appropriate    Language /Speech Content: Fluent   Language /Speech Volume: Normal    Language /Speech Rate/Productions: Pressured    Recent Memory: Intact   Remote Memory: Variable   Mood: Anxious    Orientation to Person: Yes    Orientation to Place: Yes   Orientation to Time of Day: Yes    Orientation to " Date: Yes    Situation (Do they understand why they are here?): Yes    Psychomotor Behavior: Normal    Thought Content: Clear   Thought Form: Intact      History of commitment: No       Medication    Psychotropic medications: No  Medication changes made in the last two weeks: No       Current Care Team    Primary Care Provider: No  Psychiatrist: No  Therapist: No  : No     CTSS or ARMHS: No  ACT Team: No  Other: No      Diagnosis    Adjustment Disorders  309.24 (F43.22) With anxiety       Clinical Summary and Substantiation of Recommendations    Pt identified baseline anxiety since the age of 6-7 with no mental health intervention outside of brief medication management in his teens. He reported an increase in symptoms, although still manageable, after his dads death two years ago. In the last two weeks, the symptoms have become less manageable due to a return to work. Reported passive SI without intent. Identified SI as fleeting and easy to mitigate with protective factors. Pt was willing to safety plan and open to engaging in individual therapy. We discussed coping tools for anxiety, SI, and unresolved grief/trauma from his past. We discussed the need for self care. At this time, it is my recommendation that pt discharge to home with his wife, return to work from home for now, and engage in individual therapy.   Disposition    Recommended disposition: Individual Therapy       Reviewed case and recommendations with attending provider. Attending Name: Dr. Deon MD     Attending concurs with disposition: Yes       Patient concurs with disposition: Yes       Guardian concurs with disposition: NA      Final disposition: Individual therapy .     Outpatient Details (if applicable):   Aftercare plan and appointments placed in the AVS and provided to patient: Yes. Given to patient by ED staff    Was lethal means counseling provided as a part of aftercare planning? Yes - describe Pt denied guns in the home, and  wife agreed. Pt did report he is a  knives but had never thought about hurting himself or others with them. Agreed to tell his wife if these thoughts started and she agreed to remove them from the home if so. Pt and wife agreed that he would tell her any time he was feeling hopeless and she would meet him in the garage when he gets home from work.        Assessment Details    Patient interview started at: 9:55p and completed at: 10:45p.     Total duration spent on the patient case in minutes: 1.50 hrs      CPT code(s) utilized: 15979 - Psychotherapy for Crisis - 60 (30-74*) min       Osiris [place], MS, LPCC, LADC, LM  DEC - Triage & Transition Services  Callback: [phone]    Aftercare Plan  If I am feeling unsafe or I am in a crisis, I will:   Contact my established care providers   Call the National Suicide Prevention Lifeline: 988  Go to the nearest emergency room   Call 911      Warning signs that I or other people might notice when a crisis is developing for me:   Increased anxiety  Anger outbursts   Inability to regulate emotions  Feelings of hopelessness     Things I am able to do on my own to cope or help me feel better:   Deep breathing  Play computer games  Rest  Long hot shower     Things that I am able to do with others to cope or help me better:   Spend time with Dayana, her family, and two cats at home     Things I can use or do for distraction:   Planning for the future  [employer] courses  [employer] selling     Changes I can make to support my mental health and wellness:   Begin individual therapy  Continue to spend time with Dayana and her mom  Return to work from home  Obtain health insurance        People in my life that I can ask for help: Dayana and her mom     Your [facility] has a mental health crisis team you can call 24/7: [facility]  [phone]     Other things that are important when I'm in crisis: Remember that there is light at the end of the tunnel, you have big career aspirations  "and a future family life with Dayana.       Additional resources and information:      It is recommended that you obtain insurance ASAP. Mnsure application can be completed at: https://www.mnsure.org/        Crisis Lines  Crisis Text Line  Text 462602  You will be connected with a trained live crisis counselor to provide support.     Por espanol, texto  CODY a 677945 o texto a 442-AYUDAME en WhatsApp     The [employer] (LGBTQ Youth Crisis Line)  5.307.553.2578  text START to 665-381        [employer]  Fast Tracker  Linking people to mental health and substance use disorder resources  [employer]      [place] Mental Health Warm Line  Peer to peer support  Monday thru Saturday, 12 pm to 10 pm  725.652.2152 or 0.972.061.4825  Text \"Support\" to 87828     National Mer[employer] on Mental Illness ([place])  911.997.4638 or 1.888.[place].HELPS        Mental Health Apps  My3  https://[employer].org/     VirtualHopeBox  https://[employer]/apps/virtual-hope-box/        [facility] SCHEDULING:  Today you were seen by a licensed mental health professional through [facility] ([facility])  for a crisis assessment in the Emergency Department at [facility].  It is recommended that you follow up with your estabished providers (psychiatrist, mental health therapist, and/or primary care doctor - as relevant) as soon as possible. Coordinators from [facility] will be calling you in the next 24-48 hours to ensure that you have the resources you need.  You can also contact [facility] coordinators directly at [phone].     You have been scheduled the following appointments:      Date: Thursday, 9/15/2022  Time: 12:00 pm - 1:00 pm  Provider: Yessenia Bazzi MA  Location: [facility], [address] [place], [place] [address]  Phone: [phone]  Type: Teletherapy     Patient Instructions  To reduce no shows, we ask that patients call our office at [phone] and confirm " their appointment and leave their email. We make effort to reach each client, but if we cannot reach them or they do not confirm appointment, the appointment will be removed. Please ask patients to leave a voicemail with their information.     [facility] maintains an extensive network of licensed behavioral health providers to connect patients with the services they need.  We do not charge providers a fee to participate in our referral network.  We match patients with providers based on a patient s specific needs, insurance coverage, and location.  Our first effort will be to refer you to a provider within your care system, and will utilize providers outside your care system as needed.

## 2022-09-13 NOTE — ED PROVIDER NOTES
History   Chief Complaint:  Paranoid, Anxiety, Suicidal, and Manic Behavior     The history is provided by the patient and the spouse.      Kit Goncalves is a 36 year old male with history of spinal meningitis, JOSH on CPAP, hypertension, hyperlipidemia, tobacco use disorder, and depression who presents with paranoid, anxiety, suicidal, and manic behavior. Patient reports he was recently asked to go back to working in the office after working from home. He states he could not concentrate and was constantly worrying and told his boss he can't work in-person. His boss reportedly told him that he needs to get a doctor's note for his anxiety otherwise he would lose his job. Patient states he has had bad anxiety and possible bipolar disorder for a while now, but has never received treatment or seen a provider for his mental health because he doesn't have health insurance. His wife states he has emotional highs where he's really happy and then emotional lows. She notes the 2 year anniversary of his father's death was on 9/8/22. Patient notes his father's death was likely due to an intentional drug overdose. Patient reports he feels hopeless and dysphoric. He states he feels a lot of pressure to provide for his family because his wife is a teacher and does not make a lot of money. 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 Patient endorses self-harm thoughts, but reports he has never acted on it. His wife states he's spoken of leaving the car on and inhaling car exhaust in the past. He reports no suicidal thoughts at this time in the ED. Wife states the patient has been acting paranoid and anxious. The patient endorses cough and heavy breathing which he attributes to his hypertension. His wife reports he had meningitis when he was younger. He reports he had a possible craniotomy  around the age of 4 and was [facility] [place]. In childhood, he was reportedly in assisted learning. He denies illicit drug use. Reports no fever, chills, vomiting, diarrhea, or any other COVID-19 symptoms.     Review of Systems   Constitutional: Negative for chills and fever.   Respiratory: Positive for cough.         Heavy breathing   Gastrointestinal: Negative for diarrhea and vomiting.   Psychiatric/Behavioral: Positive for decreased concentration, dysphoric mood and suicidal ideas. The patient is nervous/anxious.         Paranoid   All other systems reviewed and are negative.      Allergies:  Amoxicillin    Medications:  Albuterol inhaler    Past Medical History:     Spinal meningitis   JOSH on CPAP  Hypertension   Hyperlipidemia   Tobacco use disorder  Dyschromia  Depression   Scabies   Infectious mononucleosis   Kidney stones    Past Surgical History:    Craniotomy    Social History:  The patient presents to the ED with his wife  PCP: No Ref-Primary, Physician     Physical Exam     Patient Vitals for the past 24 hrs:   BP Temp Temp src Pulse Resp SpO2   09/12/22 1923 (!) 175/94 98.2  F (36.8  C) Temporal 85 16 99 %       Physical Exam  Constitutional: Well appearing.  HEENT: Atraumatic.  PERRL.  EOMI.  Moist mucous membranes.  Neck: Soft.  Supple.   Cardiac: Regular rate and rhythm.  No murmur or rub.  Respiratory: Clear to auscultation bilaterally.  No respiratory distress.  Abdomen: Soft and nontender.  No rebound or guarding.  Nondistended.  Musculoskeletal: No edema.  Normal range of motion.  Neurologic: Alert and oriented x3.  Normal tone and bulk.    Normal gait.  Skin: No rashes.  No edema.  Psych: Normal affect.  Normal behavior. No active suicidal ideation. No odd or manic behavior. No pressured or tangential speech.      Emergency Department Course     Emergency Department Course:  Mental Health Risk Assessment      PSS-3    Date and Time Over the past 2  weeks have you felt down, depressed, or hopeless? Over the past 2 weeks have you had thoughts of killing yourself? Have you ever attempted to kill yourself? When did this last happen? User   09/12/22 1924 yes yes no -- LLM      C-SSRS ([place])    Date and Time Q1 Wished to be Dead (Past Month) Q2 Suicidal Thoughts (Past Month) Q3 Suicidal Thought Method Q4 Suicidal Intent without Specific Plan Q5 Suicide Intent with Specific Plan Q6 Suicide Behavior (Lifetime) Within the Past 3 Months? RETIRED: Level of Risk per Screen Screening Not Complete User   09/12/22 1924 yes yes yes no yes no -- -- -- LLM              Suicide assessment completed by mental health (D.E.C., LCSW, etc.)    Reviewed:  I reviewed nursing notes, vitals, past medical history and Care Everywhere    Assessments:  1946 I obtained history and examined the patient as noted above.   2235 DEC spoke with the patient.    Consults:  2240 I spoke with DEC regarding the patient.     Interventions:  Medications - No data to display    Disposition:  The patient was discharged to home.     Impression & Plan     Medical Decision Making:  Kit Goncalves is a 36-year-old man who is afebrile and hemodynamically stable.  He is medically cleared for DEC evaluation.  He met with DEC and they are endorsing discharge home as they have no safety concerns.  They have set him up for outpatient appointment for therapy.  I think he is safe for discharge home as he has no safety concerns does not appear to be a danger to himself or others.  I did give him a note to stay home from work for few days until he follows up with therapy but he is understanding that any further time off of work or working from home would be required from a primary therapist or provider and he is understanding.  His wife is present feels safe with him coming home tonight.  I welcomed him return if he ever feels unsafe.  Return precautions were given and he was in no distress at time of  discharge.    Diagnosis:    ICD-10-CM    1. Anxiety  F41.9    2. Passive suicidal ideations  R45.851      Scribe Disclosure:  I, Mitesh Jeffries, am serving as a scribe at 7:33 PM on 9/12/2022 to document services personally performed by Jim Chavarria MD based on my observations and the provider's statements to me.            Jim Chavarria MD  09/14/22 0919

## 2022-10-16 ENCOUNTER — HEALTH MAINTENANCE LETTER (OUTPATIENT)
Age: 37
End: 2022-10-16

## 2023-06-17 ENCOUNTER — HEALTH MAINTENANCE LETTER (OUTPATIENT)
Age: 38
End: 2023-06-17

## 2024-08-10 ENCOUNTER — HEALTH MAINTENANCE LETTER (OUTPATIENT)
Age: 39
End: 2024-08-10

## 2025-08-16 ENCOUNTER — HEALTH MAINTENANCE LETTER (OUTPATIENT)
Age: 40
End: 2025-08-16